# Patient Record
Sex: FEMALE | URBAN - METROPOLITAN AREA
[De-identification: names, ages, dates, MRNs, and addresses within clinical notes are randomized per-mention and may not be internally consistent; named-entity substitution may affect disease eponyms.]

---

## 2022-06-10 ENCOUNTER — NURSE TRIAGE (OUTPATIENT)
Dept: OTHER | Facility: OTHER | Age: 27
End: 2022-06-10

## 2022-06-10 ENCOUNTER — TELEPHONE (OUTPATIENT)
Dept: URGENT CARE | Age: 27
End: 2022-06-10

## 2022-06-10 NOTE — TELEPHONE ENCOUNTER
Reason for Disposition   [1] Limp when walking AND [2] due to a direct blow or crushing injury    Answer Assessment - Initial Assessment Questions  1  MECHANISM: "How did the injury happen?" (e g , twisting injury, direct blow)       A lid fell on it  2  ONSET: "When did the injury happen?" (Minutes or hours ago)       10:40pm  3  LOCATION: "Where is the injury located?"       Right foot  4  APPEARANCE of INJURY: "What does the injury look like?"       Spot on the foot  5  WEIGHT-BEARING: "Can you put weight on that foot?" "Can you walk (four steps or more)? "        Can put weight on it; hurts if pressed on top of foot  6  SIZE: For cuts, bruises, or swelling, ask: "How large is it?" (e g , inches or centimeters;  entire joint)       Spot is two quarters  7  PAIN: "Is there pain?" If so, ask: "How bad is the pain?"    (e g , Scale 1-10; or mild, moderate, severe)      7/10 when touched  8  TETANUS: For any breaks in the skin, ask: "When was the last tetanus booster?"      Denies  9  OTHER SYMPTOMS: "Do you have any other symptoms?"       Denies  10   PREGNANCY: "Is there any chance you are pregnant?" "When was your last menstrual period?"        Denies    Protocols used: FOOT AND ANKLE INJURY-ADULT-

## 2022-06-10 NOTE — LETTER
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 Yes   Employee Present for Call:  Yes        INJURY INFORMATION  Type of Injury of Illness:  []Slip/Trip/Fall   []Exposure  [x]Struck by    []Carrying/Lifting/Ergonomic    []Overuse   []Other  Body System Involved: Foot    Body Side:  Right  Patient description of injury:  A little spot    RECOMMENDED ACTION  RN Referral Recommendation:     [x]Refer Immediate Care     [] No Referral    Employee Decision:                         []Refer Immediate Care               [] No Referral     PLAN OF CARE   []No Additional Care       []First Aid On-Site []Immediate Emergency Care     [x]F/U with Occupational Medicine  RN Notes:  Advised to call PCP in morning

## 2022-06-10 NOTE — TELEPHONE ENCOUNTER
Email to Nathaniel Mccormick and [employer] at [facility] to see if they can see the employee on site to evaluate if appt needed today at Occupational Medicine

## 2022-06-10 NOTE — LETTER
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 Yes   Employee Present for Call:  Yes        INJURY INFORMATION  Type of Injury of Illness:  []Slip/Trip/Fall   []Exposure  [x]Struck by    []Carrying/Lifting/Ergonomic    []Overuse   []Other  Body System Involved:   Foot    Body Side:  Right  Patient description of injury:  A little spot    RECOMMENDED ACTION  RN Referral Recommendation:     [x]Refer Immediate Care     [] No Referral    Employee Decision:                         []Refer Immediate Care               [] No Referral     PLAN OF CARE   []No Additional Care       []First Aid On-Site []Immediate Emergency Care     [x]F/U with Occupational Medicine  RN Notes:

## 2022-06-10 NOTE — TELEPHONE ENCOUNTER
Regarding: [facility]- Right Foot damage  ----- Message from [facility] sent at 6/10/2022  1:08 AM EDT -----  "I metal machine part fell on my right foot "